# Patient Record
Sex: MALE | Race: WHITE | NOT HISPANIC OR LATINO | Employment: STUDENT | URBAN - NONMETROPOLITAN AREA
[De-identification: names, ages, dates, MRNs, and addresses within clinical notes are randomized per-mention and may not be internally consistent; named-entity substitution may affect disease eponyms.]

---
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
## 2020-08-18 ENCOUNTER — LAB (OUTPATIENT)
Dept: INTERNAL MEDICINE | Facility: CLINIC | Age: 20
End: 2020-08-18

## 2020-08-18 DIAGNOSIS — Z00.00 ROUTINE GENERAL MEDICAL EXAMINATION AT A HEALTH CARE FACILITY: Primary | ICD-10-CM

## 2020-08-19 ENCOUNTER — OFFICE VISIT (OUTPATIENT)
Dept: INTERNAL MEDICINE | Facility: CLINIC | Age: 20
End: 2020-08-19

## 2020-08-19 VITALS
HEIGHT: 68 IN | OXYGEN SATURATION: 97 % | TEMPERATURE: 98.2 F | WEIGHT: 199.6 LBS | DIASTOLIC BLOOD PRESSURE: 70 MMHG | SYSTOLIC BLOOD PRESSURE: 110 MMHG | HEART RATE: 71 BPM | BODY MASS INDEX: 30.25 KG/M2

## 2020-08-19 DIAGNOSIS — Z00.00 ANNUAL PHYSICAL EXAM: Primary | ICD-10-CM

## 2020-08-19 DIAGNOSIS — G89.29 CHRONIC PAIN OF LEFT KNEE: ICD-10-CM

## 2020-08-19 DIAGNOSIS — M25.511 CHRONIC RIGHT SHOULDER PAIN: ICD-10-CM

## 2020-08-19 DIAGNOSIS — G89.29 CHRONIC RIGHT SHOULDER PAIN: ICD-10-CM

## 2020-08-19 DIAGNOSIS — M25.562 CHRONIC PAIN OF LEFT KNEE: ICD-10-CM

## 2020-08-19 LAB
ALBUMIN SERPL-MCNC: 4.5 G/DL (ref 3.5–5.2)
ALBUMIN/GLOB SERPL: 2.5 G/DL
ALP SERPL-CCNC: 58 U/L (ref 39–117)
ALT SERPL-CCNC: 14 U/L (ref 1–41)
APPEARANCE UR: CLEAR
AST SERPL-CCNC: 13 U/L (ref 1–40)
BASOPHILS # BLD AUTO: 0.04 10*3/MM3 (ref 0–0.2)
BASOPHILS NFR BLD AUTO: 0.6 % (ref 0–1.5)
BILIRUB SERPL-MCNC: 0.2 MG/DL (ref 0–1.2)
BILIRUB UR QL STRIP: NEGATIVE
BUN SERPL-MCNC: 14 MG/DL (ref 6–20)
BUN/CREAT SERPL: 17.1 (ref 7–25)
CALCIUM SERPL-MCNC: 9.3 MG/DL (ref 8.6–10.5)
CHLORIDE SERPL-SCNC: 101 MMOL/L (ref 98–107)
CHOLEST SERPL-MCNC: 152 MG/DL (ref 0–200)
CO2 SERPL-SCNC: 26.3 MMOL/L (ref 22–29)
COLOR UR: YELLOW
CREAT SERPL-MCNC: 0.82 MG/DL (ref 0.76–1.27)
EOSINOPHIL # BLD AUTO: 0.2 10*3/MM3 (ref 0–0.4)
EOSINOPHIL NFR BLD AUTO: 3.2 % (ref 0.3–6.2)
ERYTHROCYTE [DISTWIDTH] IN BLOOD BY AUTOMATED COUNT: 12.5 % (ref 12.3–15.4)
GLOBULIN SER CALC-MCNC: 1.8 GM/DL
GLUCOSE SERPL-MCNC: 91 MG/DL (ref 65–99)
GLUCOSE UR QL: NEGATIVE
HCT VFR BLD AUTO: 46.1 % (ref 37.5–51)
HDLC SERPL-MCNC: 37 MG/DL (ref 40–60)
HGB BLD-MCNC: 15.7 G/DL (ref 13–17.7)
HGB UR QL STRIP: NEGATIVE
IMM GRANULOCYTES # BLD AUTO: 0.01 10*3/MM3 (ref 0–0.05)
IMM GRANULOCYTES NFR BLD AUTO: 0.2 % (ref 0–0.5)
KETONES UR QL STRIP: NEGATIVE
LDLC SERPL CALC-MCNC: 98 MG/DL (ref 0–100)
LEUKOCYTE ESTERASE UR QL STRIP: NEGATIVE
LYMPHOCYTES # BLD AUTO: 2.78 10*3/MM3 (ref 0.7–3.1)
LYMPHOCYTES NFR BLD AUTO: 44.9 % (ref 19.6–45.3)
MCH RBC QN AUTO: 30.4 PG (ref 26.6–33)
MCHC RBC AUTO-ENTMCNC: 34.1 G/DL (ref 31.5–35.7)
MCV RBC AUTO: 89.3 FL (ref 79–97)
MONOCYTES # BLD AUTO: 0.52 10*3/MM3 (ref 0.1–0.9)
MONOCYTES NFR BLD AUTO: 8.4 % (ref 5–12)
NEUTROPHILS # BLD AUTO: 2.64 10*3/MM3 (ref 1.7–7)
NEUTROPHILS NFR BLD AUTO: 42.7 % (ref 42.7–76)
NITRITE UR QL STRIP: NEGATIVE
NRBC BLD AUTO-RTO: 0 /100 WBC (ref 0–0.2)
PH UR STRIP: 5.5 [PH] (ref 5–8)
PLATELET # BLD AUTO: 255 10*3/MM3 (ref 140–450)
POTASSIUM SERPL-SCNC: 3.8 MMOL/L (ref 3.5–5.2)
PROT SERPL-MCNC: 6.3 G/DL (ref 6–8.5)
PROT UR QL STRIP: NEGATIVE
RBC # BLD AUTO: 5.16 10*6/MM3 (ref 4.14–5.8)
SODIUM SERPL-SCNC: 138 MMOL/L (ref 136–145)
SP GR UR: 1.02 (ref 1–1.03)
TRIGL SERPL-MCNC: 84 MG/DL (ref 0–150)
TSH SERPL DL<=0.005 MIU/L-ACNC: 4.67 UIU/ML (ref 0.27–4.2)
URATE SERPL-MCNC: 6.1 MG/DL (ref 3.4–7)
UROBILINOGEN UR STRIP-MCNC: NORMAL MG/DL
VLDLC SERPL CALC-MCNC: 16.8 MG/DL
WBC # BLD AUTO: 6.19 10*3/MM3 (ref 3.4–10.8)

## 2020-08-19 PROCEDURE — 99395 PREV VISIT EST AGE 18-39: CPT | Performed by: NURSE PRACTITIONER

## 2020-08-19 NOTE — PROGRESS NOTES
Subjective   Felix Fernandez is a 19 y.o. male.   Chief Complaint   Patient presents with   • Annual Exam     left knee pain and right rotator cuff       Felix presents today for an annual physical.  He denies medical history and has had yearly labs completed.  He does specifically want to discuss right shoulder pain and left knee pain.  Felix has played baseball since he was 3 years old.  His position has always been a catcher which requires prolonged and repeated squatting.  He is right handed as well, so throwing was done with the right upper extremity.  He is no longer playing baseball but complains of left knee and right shoulder pain that has been ongoing for over 1 year.      Left Knee  He denies and specific injury to the left knee, but does have a history of a staph infection to the left thigh resulting in surgery.  Pain is worsened to the knee with squatting.  He also reports he has days on long term standing and both of his knees will ache.   He continues to exercise and lift weights as well as riding a bike.  He reports good strength to the knee and denies dislocation.    Right Shoulder  He does report a history of tendonitis to the right upper extremity as well a strain to a ligament in the extremity while still in school.  This is the arm he threw a baseball with.  He also plays basketball and states shooting a basketball can be difficult.  He states the shoulder pops with rotation and gets right when he tries to lift it above his head.          The following portions of the patient's history were reviewed and updated as appropriate: allergies, current medications, past family history, past medical history, past social history, past surgical history and problem list.    Review of Systems   Constitutional: Negative for activity change, appetite change, fatigue, fever, unexpected weight gain and unexpected weight loss.   HENT: Negative for swollen glands, trouble swallowing and voice change.    Eyes:  Negative for blurred vision and visual disturbance.   Respiratory: Negative for cough and shortness of breath.    Cardiovascular: Negative for chest pain, palpitations and leg swelling.   Gastrointestinal: Negative for abdominal pain, constipation, diarrhea, nausea, vomiting and indigestion.   Endocrine: Negative for cold intolerance, heat intolerance, polydipsia and polyphagia.   Genitourinary: Negative for dysuria and frequency.   Musculoskeletal: Negative for arthralgias, back pain, joint swelling and neck pain.        Right Shoulder pain; Left Knee pain   Skin: Negative for color change, rash and skin lesions.   Neurological: Negative for dizziness, weakness, headache, memory problem and confusion.   Hematological: Does not bruise/bleed easily.   Psychiatric/Behavioral: Negative for agitation, hallucinations and suicidal ideas. The patient is not nervous/anxious.        Objective   Past Medical History:   Diagnosis Date   • Staph infection 2013    Left leg      Past Surgical History:   Procedure Laterality Date   • LEG SURGERY  2013   • TYMPANOSTOMY TUBE PLACEMENT        No current outpatient medications on file.     Vitals:    08/19/20 0831   BP: 110/70   Pulse: 71   Temp: 98.2 °F (36.8 °C)   SpO2: 97%         08/19/20  0831   Weight: 90.5 kg (199 lb 9.6 oz)     Patient's Body mass index is 30.35 kg/m². BMI is above normal parameters. Recommendations include: exercise counseling and nutrition counseling.      Physical Exam   Constitutional: He is oriented to person, place, and time. He appears well-developed and well-nourished.   HENT:   Head: Normocephalic and atraumatic.   Right Ear: Tympanic membrane and external ear normal.   Left Ear: Tympanic membrane and external ear normal.   Nose: Nose normal.   Mouth/Throat: No oral lesions. No posterior oropharyngeal erythema. Tonsils are 1+ on the right. Tonsils are 0 on the left. No tonsillar exudate.   Eyes: Pupils are equal, round, and reactive to light.  Conjunctivae and EOM are normal.   Neck: Normal range of motion. Neck supple. No spinous process tenderness and no muscular tenderness present. Carotid bruit is not present. Normal range of motion present. No thyromegaly present.   Cardiovascular: Normal rate, regular rhythm, normal heart sounds and intact distal pulses.   Pulses:       Radial pulses are 2+ on the right side, and 2+ on the left side.   No bilateral lower extremity edema   Pulmonary/Chest: Effort normal and breath sounds normal.   Abdominal: Soft. Bowel sounds are normal. There is no tenderness.   Musculoskeletal:        Right shoulder: He exhibits decreased range of motion and tenderness (With range of motion). He exhibits no bony tenderness.        Left knee: He exhibits normal range of motion, no swelling and no ecchymosis. Tenderness found. LCL (Palpation) tenderness noted.   Right shoulder has full range of motion, but it is slowed, tight, and uncomfortable for the patient.  With rotation of the shoulder a noticeable pop is heard.     There is full strength to the left knee and the right shoulder.        Lymphadenopathy:     He has no cervical adenopathy.   Neurological: He is alert and oriented to person, place, and time.   Skin: Skin is warm and dry.   Psychiatric: He has a normal mood and affect. His speech is normal and behavior is normal. Thought content normal.   Nursing note and vitals reviewed.            Assessment/Plan   Diagnoses and all orders for this visit:    1. Annual physical exam (Primary)    2. Chronic pain of left knee    3. Chronic right shoulder pain    Yearly labs reviewed.  Will check Free T3 and Free T4 due to elevated TSH.  Patient denies fatigue, brittle nails or hair.  Otherwise labs look good.   Blood pressure is normal.  I discussed starting with x-ray imaging for the knee and shoulder at this time.  He states he doesn't want to do a lot about it right now stating he doesn't want to increase costs of his parents.   He manages with Ibuprofen or Tylenol when symptoms worsen.  I have advised stopping exercise when he starts to feel discomfort and to avoid heavy weights with squatting.  I have also discussed icing the knee after exercise or after long term standing.  I have requested that he notify the office if he would like to look into these issues more.  I feel a lot of his issues is likely from natural wear and tear from repetitive motions of baseball.

## 2020-08-20 LAB
Lab: NORMAL
T3FREE SERPL-MCNC: 4.1 PG/ML (ref 2–4.4)
T4 FREE SERPL-MCNC: 1.37 NG/DL (ref 0.93–1.7)
WRITTEN AUTHORIZATION: NORMAL

## 2021-03-15 ENCOUNTER — OFFICE VISIT (OUTPATIENT)
Dept: INTERNAL MEDICINE | Facility: CLINIC | Age: 21
End: 2021-03-15

## 2021-03-15 VITALS
HEART RATE: 81 BPM | OXYGEN SATURATION: 99 % | HEIGHT: 68 IN | TEMPERATURE: 97.1 F | SYSTOLIC BLOOD PRESSURE: 116 MMHG | BODY MASS INDEX: 30.04 KG/M2 | WEIGHT: 198.2 LBS | DIASTOLIC BLOOD PRESSURE: 76 MMHG

## 2021-03-15 DIAGNOSIS — J03.90 EXUDATIVE TONSILLITIS: ICD-10-CM

## 2021-03-15 DIAGNOSIS — S39.011A STRAIN OF ABDOMINAL MUSCLE, INITIAL ENCOUNTER: Primary | ICD-10-CM

## 2021-03-15 PROBLEM — R10.9 UNSPECIFIED ABDOMINAL PAIN: Status: ACTIVE | Noted: 2020-10-08

## 2021-03-15 LAB
EXPIRATION DATE: NORMAL
INTERNAL CONTROL: NORMAL
Lab: NORMAL
S PYO AG THROAT QL: NEGATIVE

## 2021-03-15 PROCEDURE — 87880 STREP A ASSAY W/OPTIC: CPT | Performed by: NURSE PRACTITIONER

## 2021-03-15 PROCEDURE — 99213 OFFICE O/P EST LOW 20 MIN: CPT | Performed by: NURSE PRACTITIONER

## 2021-03-15 RX ORDER — CETIRIZINE HYDROCHLORIDE 10 MG/1
10 TABLET ORAL DAILY PRN
COMMUNITY

## 2021-03-15 RX ORDER — AZITHROMYCIN 250 MG/1
TABLET, FILM COATED ORAL
Qty: 6 TABLET | Refills: 0 | Status: SHIPPED | OUTPATIENT
Start: 2021-03-15

## 2021-03-15 NOTE — PROGRESS NOTES
Subjective   Felix Fernandez is a 20 y.o. male.   Chief Complaint   Patient presents with   • Hernia     right sided abdominal, lifting weights   • Sinusitis       Felix presents today for complaints of possible hernia after a weight lifting incident.  He report was bent over with weight on his shoulders, when he stood he felt a burning sensation to the right lower abdominal wall.  He states he is no longer having any pain but does have some numbness to the area.  He was carrying luggage recently and could feel a pulling sensation in that area but otherwise no symptoms.     Sore Throat   This is a new problem. The current episode started in the past 7 days. The problem has been gradually worsening. There has been no fever. Pertinent negatives include no abdominal pain, coughing, diarrhea, neck pain, shortness of breath, swollen glands, trouble swallowing or vomiting. Exposure to: History of strep. He has tried nothing for the symptoms.        The following portions of the patient's history were reviewed and updated as appropriate: allergies, current medications, past family history, past medical history, past social history, past surgical history and problem list.    Review of Systems   Constitutional: Negative for activity change, appetite change, fatigue, fever, unexpected weight gain and unexpected weight loss.   HENT: Positive for sore throat. Negative for swollen glands, trouble swallowing and voice change.    Eyes: Negative for blurred vision and visual disturbance.   Respiratory: Negative for cough and shortness of breath.    Cardiovascular: Negative for chest pain, palpitations and leg swelling.   Gastrointestinal: Negative for abdominal pain, constipation, diarrhea, nausea, vomiting and indigestion.        Right lower abdominal burning/pulling   Endocrine: Negative for cold intolerance, heat intolerance, polydipsia and polyphagia.   Genitourinary: Negative for dysuria and frequency.   Musculoskeletal:  Negative for arthralgias, back pain, joint swelling and neck pain.   Skin: Negative for color change, rash and skin lesions.   Neurological: Negative for dizziness, weakness, headache, memory problem and confusion.   Hematological: Does not bruise/bleed easily.   Psychiatric/Behavioral: Negative for agitation, hallucinations and suicidal ideas. The patient is not nervous/anxious.        Objective   Past Medical History:   Diagnosis Date   • Staph infection 2013    Left leg      Past Surgical History:   Procedure Laterality Date   • LEG SURGERY  2013   • TYMPANOSTOMY TUBE PLACEMENT          Current Outpatient Medications:   •  cetirizine (zyrTEC) 10 MG tablet, Take 10 mg by mouth Daily As Needed for Allergies., Disp: , Rfl:   •  azithromycin (Zithromax Z-Ben) 250 MG tablet, Take 2 tablets by mouth on day 1, then 1 tablet daily on days 2-5, Disp: 6 tablet, Rfl: 0     Vitals:    03/15/21 1557   BP: 116/76   Pulse: 81   Temp: 97.1 °F (36.2 °C)   SpO2: 99%         03/15/21  1557   Weight: 89.9 kg (198 lb 3.2 oz)           Physical Exam  Constitutional:       General: He is not in acute distress.     Appearance: He is well-developed.   HENT:      Head: Normocephalic.      Right Ear: Tympanic membrane and external ear normal.      Left Ear: Tympanic membrane and external ear normal.      Mouth/Throat:      Mouth: Mucous membranes are moist. No oral lesions.      Pharynx: Oropharynx is clear.      Tonsils: Tonsillar exudate present. 2+ on the right. 2+ on the left.   Eyes:      Conjunctiva/sclera: Conjunctivae normal.   Cardiovascular:      Rate and Rhythm: Normal rate and regular rhythm.      Pulses: Normal pulses.      Heart sounds: Normal heart sounds.   Pulmonary:      Effort: Pulmonary effort is normal.      Breath sounds: Normal breath sounds.   Abdominal:      Tenderness: There is no abdominal tenderness.      Hernia: There is no hernia in the umbilical area, ventral area, left inguinal area or right inguinal area.    Lymphadenopathy:      Cervical: Cervical adenopathy present.      Right cervical: Superficial cervical adenopathy (Bilateral tonsillar nodes) present.   Skin:     General: Skin is warm and dry.   Neurological:      Mental Status: He is alert and oriented to person, place, and time.      Gait: Gait normal.   Psychiatric:         Mood and Affect: Mood normal.         Speech: Speech normal.         Behavior: Behavior normal.         Thought Content: Thought content normal.                Assessment/Plan   Diagnoses and all orders for this visit:    1. Strain of abdominal muscle, initial encounter (Primary)  -     US Pelvis Complete; Future    2. Exudative tonsillitis  -     azithromycin (Zithromax Z-Ben) 250 MG tablet; Take 2 tablets by mouth on day 1, then 1 tablet daily on days 2-5  Dispense: 6 tablet; Refill: 0  -     COVID-19,[facility] ROUTINE, NP/OP SWAB IN TRANSPORT MEDIA OR ESWAB 72 HR TAT - Swab, Oropharynx  -     POC Rapid Strep A        Strep test negative- will test for COVID as well.  Advised staying home from work tomorrow to see how he responds to antibiotic.  Warm salt water gargles to help with discomfort.  He may need to quarantine for 10 days depending on how he responds to antibiotic.  This does not appear to be mono as patient has no posterior nodes, upper left abdominal discomfort, or fever.    Will get U/S of pelvis to evaluate further.  I did not palpate a hernia on exam today.  I suspect this is a muscle strain.  I have advised avoiding lifting at this time and allow the muscle heal.

## 2021-03-16 ENCOUNTER — HOSPITAL ENCOUNTER (OUTPATIENT)
Dept: ULTRASOUND IMAGING | Facility: HOSPITAL | Age: 21
Discharge: HOME | End: 2021-03-16
Admitting: NURSE PRACTITIONER

## 2021-03-16 DIAGNOSIS — S39.011A STRAIN OF ABDOMINAL MUSCLE, INITIAL ENCOUNTER: ICD-10-CM

## 2021-03-16 PROCEDURE — 76856 US EXAM PELVIC COMPLETE: CPT

## 2021-03-16 NOTE — PROGRESS NOTES
Ultrasound does not show evidence of a hernia.  His symptoms are likely from a muscle strain.  Rest the area and avoid heavy lifting at this time and follow up if symptoms do not improve over the next week.     How is his sore throat after starting antibiotic? If he has not seen improvement, I would like him to remain off of work until COVID results are back.

## 2021-03-17 LAB — SARS-COV-2 RNA RESP QL NAA+PROBE: NOT DETECTED

## 2023-07-07 ENCOUNTER — TELEPHONE (OUTPATIENT)
Dept: INTERNAL MEDICINE | Facility: CLINIC | Age: 23
End: 2023-07-07

## 2023-07-07 NOTE — TELEPHONE ENCOUNTER
Called patient and per January DUMONT,     I advised patient that he could come in before his appointment and get his labs done. Patient states that will work and he will be here 07/13.

## 2023-07-07 NOTE — TELEPHONE ENCOUNTER
Caller: Felix Fernandez    Relationship: Self    Best call back number:  [phone]     What is the best time to reach you:  ANYTIME    Who are you requesting to speak with :  CLINICAL    What was the call regarding:  [facility] [place] [facility]UR[facility] HIS LUNCH BREAK ON 7/10/23.  PATIENT WANTS TO KNOW IF OFFICE CAN ACCOMMODATE THIS REQUEST.

## 2023-07-07 NOTE — TELEPHONE ENCOUNTER
Caller: Felix Fernandez    Relationship: Self    Best call back number: [phone]    What is the best time to reach you: ANYTIME    Who are you requesting to speak with (clinical staff, provider,  specific staff member): CLINICAL    What was the call regarding: RETURNING A CALL FROM THE OFFICE REGARDING APPOINTMENT ON 07.13.2023.

## 2024-09-26 ENCOUNTER — NURSE TRIAGE (OUTPATIENT)
Dept: CALL CENTER | Facility: HOSPITAL | Age: 24
End: 2024-09-26
Payer: COMMERCIAL